# Patient Record
Sex: FEMALE | Race: WHITE | NOT HISPANIC OR LATINO | Employment: OTHER | URBAN - METROPOLITAN AREA
[De-identification: names, ages, dates, MRNs, and addresses within clinical notes are randomized per-mention and may not be internally consistent; named-entity substitution may affect disease eponyms.]

---

## 2023-06-19 ENCOUNTER — OFFICE VISIT (OUTPATIENT)
Dept: NEUROLOGY | Facility: CLINIC | Age: 68
End: 2023-06-19
Payer: MEDICARE

## 2023-06-19 ENCOUNTER — LAB VISIT (OUTPATIENT)
Dept: LAB | Facility: HOSPITAL | Age: 68
End: 2023-06-19
Attending: PSYCHIATRY & NEUROLOGY
Payer: MEDICARE

## 2023-06-19 ENCOUNTER — TELEPHONE (OUTPATIENT)
Dept: NEUROLOGY | Facility: CLINIC | Age: 68
End: 2023-06-19
Payer: MEDICARE

## 2023-06-19 VITALS
HEART RATE: 91 BPM | HEIGHT: 66 IN | SYSTOLIC BLOOD PRESSURE: 122 MMHG | WEIGHT: 211.44 LBS | DIASTOLIC BLOOD PRESSURE: 85 MMHG | BODY MASS INDEX: 33.98 KG/M2

## 2023-06-19 DIAGNOSIS — R41.3 OTHER AMNESIA: Primary | ICD-10-CM

## 2023-06-19 DIAGNOSIS — Z81.8 FAMILY HISTORY OF DEMENTIA: ICD-10-CM

## 2023-06-19 DIAGNOSIS — R41.3 OTHER AMNESIA: ICD-10-CM

## 2023-06-19 DIAGNOSIS — R41.3 MEMORY LOSS: ICD-10-CM

## 2023-06-19 LAB
ALBUMIN SERPL BCP-MCNC: 4 G/DL (ref 3.5–5.2)
ALP SERPL-CCNC: 95 U/L (ref 55–135)
ALT SERPL W/O P-5'-P-CCNC: 17 U/L (ref 10–44)
ANION GAP SERPL CALC-SCNC: 14 MMOL/L (ref 8–16)
AST SERPL-CCNC: 17 U/L (ref 10–40)
BILIRUB SERPL-MCNC: 0.6 MG/DL (ref 0.1–1)
BUN SERPL-MCNC: 14 MG/DL (ref 8–23)
CALCIUM SERPL-MCNC: 9.7 MG/DL (ref 8.7–10.5)
CHLORIDE SERPL-SCNC: 103 MMOL/L (ref 95–110)
CO2 SERPL-SCNC: 24 MMOL/L (ref 23–29)
CREAT SERPL-MCNC: 0.8 MG/DL (ref 0.5–1.4)
EST. GFR  (NO RACE VARIABLE): >60 ML/MIN/1.73 M^2
FOLATE SERPL-MCNC: 6.7 NG/ML (ref 4–24)
GLUCOSE SERPL-MCNC: 212 MG/DL (ref 70–110)
POTASSIUM SERPL-SCNC: 4.5 MMOL/L (ref 3.5–5.1)
PROT SERPL-MCNC: 7.3 G/DL (ref 6–8.4)
SODIUM SERPL-SCNC: 141 MMOL/L (ref 136–145)
T4 FREE SERPL-MCNC: 0.9 NG/DL (ref 0.71–1.51)
TSH SERPL DL<=0.005 MIU/L-ACNC: 4.41 UIU/ML (ref 0.4–4)
VIT B12 SERPL-MCNC: 411 PG/ML (ref 210–950)

## 2023-06-19 PROCEDURE — 99205 PR OFFICE/OUTPT VISIT, NEW, LEVL V, 60-74 MIN: ICD-10-PCS | Mod: S$GLB,,, | Performed by: PSYCHIATRY & NEUROLOGY

## 2023-06-19 PROCEDURE — 1126F PR PAIN SEVERITY QUANTIFIED, NO PAIN PRESENT: ICD-10-PCS | Mod: CPTII,S$GLB,, | Performed by: PSYCHIATRY & NEUROLOGY

## 2023-06-19 PROCEDURE — 85025 COMPLETE CBC W/AUTO DIFF WBC: CPT | Performed by: PSYCHIATRY & NEUROLOGY

## 2023-06-19 PROCEDURE — 82746 ASSAY OF FOLIC ACID SERUM: CPT | Performed by: PSYCHIATRY & NEUROLOGY

## 2023-06-19 PROCEDURE — 3008F PR BODY MASS INDEX (BMI) DOCUMENTED: ICD-10-PCS | Mod: CPTII,S$GLB,, | Performed by: PSYCHIATRY & NEUROLOGY

## 2023-06-19 PROCEDURE — 3008F BODY MASS INDEX DOCD: CPT | Mod: CPTII,S$GLB,, | Performed by: PSYCHIATRY & NEUROLOGY

## 2023-06-19 PROCEDURE — 1160F PR REVIEW ALL MEDS BY PRESCRIBER/CLIN PHARMACIST DOCUMENTED: ICD-10-PCS | Mod: CPTII,S$GLB,, | Performed by: PSYCHIATRY & NEUROLOGY

## 2023-06-19 PROCEDURE — 1159F MED LIST DOCD IN RCRD: CPT | Mod: CPTII,S$GLB,, | Performed by: PSYCHIATRY & NEUROLOGY

## 2023-06-19 PROCEDURE — 82607 VITAMIN B-12: CPT | Performed by: PSYCHIATRY & NEUROLOGY

## 2023-06-19 PROCEDURE — 3079F DIAST BP 80-89 MM HG: CPT | Mod: CPTII,S$GLB,, | Performed by: PSYCHIATRY & NEUROLOGY

## 2023-06-19 PROCEDURE — 3074F PR MOST RECENT SYSTOLIC BLOOD PRESSURE < 130 MM HG: ICD-10-PCS | Mod: CPTII,S$GLB,, | Performed by: PSYCHIATRY & NEUROLOGY

## 2023-06-19 PROCEDURE — 4010F ACE/ARB THERAPY RXD/TAKEN: CPT | Mod: CPTII,S$GLB,, | Performed by: PSYCHIATRY & NEUROLOGY

## 2023-06-19 PROCEDURE — 4010F PR ACE/ARB THEARPY RXD/TAKEN: ICD-10-PCS | Mod: CPTII,S$GLB,, | Performed by: PSYCHIATRY & NEUROLOGY

## 2023-06-19 PROCEDURE — 1159F PR MEDICATION LIST DOCUMENTED IN MEDICAL RECORD: ICD-10-PCS | Mod: CPTII,S$GLB,, | Performed by: PSYCHIATRY & NEUROLOGY

## 2023-06-19 PROCEDURE — 1160F RVW MEDS BY RX/DR IN RCRD: CPT | Mod: CPTII,S$GLB,, | Performed by: PSYCHIATRY & NEUROLOGY

## 2023-06-19 PROCEDURE — 99999 PR PBB SHADOW E&M-EST. PATIENT-LVL IV: ICD-10-PCS | Mod: PBBFAC,,, | Performed by: PSYCHIATRY & NEUROLOGY

## 2023-06-19 PROCEDURE — 1100F PR PT FALLS ASSESS DOC 2+ FALLS/FALL W/INJURY/YR: ICD-10-PCS | Mod: CPTII,S$GLB,, | Performed by: PSYCHIATRY & NEUROLOGY

## 2023-06-19 PROCEDURE — 80053 COMPREHEN METABOLIC PANEL: CPT | Performed by: PSYCHIATRY & NEUROLOGY

## 2023-06-19 PROCEDURE — 86592 SYPHILIS TEST NON-TREP QUAL: CPT | Performed by: PSYCHIATRY & NEUROLOGY

## 2023-06-19 PROCEDURE — 99205 OFFICE O/P NEW HI 60 MIN: CPT | Mod: S$GLB,,, | Performed by: PSYCHIATRY & NEUROLOGY

## 2023-06-19 PROCEDURE — 3079F PR MOST RECENT DIASTOLIC BLOOD PRESSURE 80-89 MM HG: ICD-10-PCS | Mod: CPTII,S$GLB,, | Performed by: PSYCHIATRY & NEUROLOGY

## 2023-06-19 PROCEDURE — 84439 ASSAY OF FREE THYROXINE: CPT | Performed by: PSYCHIATRY & NEUROLOGY

## 2023-06-19 PROCEDURE — 3074F SYST BP LT 130 MM HG: CPT | Mod: CPTII,S$GLB,, | Performed by: PSYCHIATRY & NEUROLOGY

## 2023-06-19 PROCEDURE — 84443 ASSAY THYROID STIM HORMONE: CPT | Performed by: PSYCHIATRY & NEUROLOGY

## 2023-06-19 PROCEDURE — 99999 PR PBB SHADOW E&M-EST. PATIENT-LVL IV: CPT | Mod: PBBFAC,,, | Performed by: PSYCHIATRY & NEUROLOGY

## 2023-06-19 PROCEDURE — 3288F FALL RISK ASSESSMENT DOCD: CPT | Mod: CPTII,S$GLB,, | Performed by: PSYCHIATRY & NEUROLOGY

## 2023-06-19 PROCEDURE — 3288F PR FALLS RISK ASSESSMENT DOCUMENTED: ICD-10-PCS | Mod: CPTII,S$GLB,, | Performed by: PSYCHIATRY & NEUROLOGY

## 2023-06-19 PROCEDURE — 36415 COLL VENOUS BLD VENIPUNCTURE: CPT | Mod: PO | Performed by: PSYCHIATRY & NEUROLOGY

## 2023-06-19 PROCEDURE — 1126F AMNT PAIN NOTED NONE PRSNT: CPT | Mod: CPTII,S$GLB,, | Performed by: PSYCHIATRY & NEUROLOGY

## 2023-06-19 PROCEDURE — 1100F PTFALLS ASSESS-DOCD GE2>/YR: CPT | Mod: CPTII,S$GLB,, | Performed by: PSYCHIATRY & NEUROLOGY

## 2023-06-19 PROCEDURE — 84425 ASSAY OF VITAMIN B-1: CPT | Performed by: PSYCHIATRY & NEUROLOGY

## 2023-06-19 RX ORDER — HYDROCODONE BITARTRATE AND ACETAMINOPHEN 5; 325 MG/1; MG/1
1 TABLET ORAL DAILY PRN
COMMUNITY
Start: 2023-06-06

## 2023-06-19 RX ORDER — AMLODIPINE AND VALSARTAN 10; 160 MG/1; MG/1
1 TABLET ORAL EVERY MORNING
COMMUNITY
Start: 2023-04-28

## 2023-06-19 RX ORDER — SIMVASTATIN 10 MG/1
10 TABLET, FILM COATED ORAL
COMMUNITY
Start: 2023-06-15

## 2023-06-19 RX ORDER — BUSPIRONE HYDROCHLORIDE 5 MG/1
5 TABLET ORAL 3 TIMES DAILY
COMMUNITY
Start: 2023-03-29

## 2023-06-19 RX ORDER — AMITRIPTYLINE HYDROCHLORIDE 50 MG/1
50 TABLET, FILM COATED ORAL NIGHTLY
COMMUNITY
Start: 2023-06-12 | End: 2024-02-05

## 2023-06-19 RX ORDER — DULOXETIN HYDROCHLORIDE 60 MG/1
60 CAPSULE, DELAYED RELEASE ORAL NIGHTLY
COMMUNITY
Start: 2023-03-29

## 2023-06-19 RX ORDER — LIDOCAINE AND PRILOCAINE 25; 25 MG/G; MG/G
CREAM TOPICAL
COMMUNITY
Start: 2023-05-10

## 2023-06-19 NOTE — PROGRESS NOTES
Date: 6/19/2023    Patient ID: Maria Antonia Nolasco is a 67 y.o. female.    Referring Provider:  Genesis Lewis, PhD    Chief Complaint: Memory Loss      History of Present Illness:  Ms. Nolasco is a 67 y.o. female who presents referred by Genesis Lewis, PhD today for evaluation of memory loss. The patient was accompanied by her daughter and son in law who also contributed to the following history.     She has had some concerns about forgetfulness. Her family notes she is forgetful.     She is driving and no trouble. Sometimes she has missed paying bills. She has to leave reminders. She puts notes everywhere.     She has anxiety and depression takes amitriptyline, buspar, and cymbalta. This helps her anxiety. Sometimes she still feels anxious.     She has GIANCARLO but doesn't wear the CPAP. She has claustrophobia.     She has arthritis and takes norco sometimes, about once per month.     Her mother had dementia. She was in her late 80s when she developed dementia and she declined rapidly from there.     Allergies:  Review of patient's allergies indicates:  No Known Allergies    Current Medications:  Current Outpatient Medications   Medication Sig Dispense Refill    amitriptyline (ELAVIL) 50 MG tablet Take 50 mg by mouth every evening.      amlodipine-valsartan (EXFORGE)  mg per tablet Take 1 tablet by mouth every morning.      busPIRone (BUSPAR) 5 MG Tab Take 5 mg by mouth 3 (three) times daily.      DULoxetine (CYMBALTA) 60 MG capsule Take 60 mg by mouth every evening.      HYDROcodone-acetaminophen (NORCO) 5-325 mg per tablet Take 1 tablet by mouth daily as needed.      LIDOcaine-prilocaine (EMLA) cream SMARTSIG:Topical Every 12 Hours PRN      simvastatin (ZOCOR) 10 MG tablet Take 10 mg by mouth.       No current facility-administered medications for this visit.       Past Medical History:  Past Medical History:   Diagnosis Date    Anxiety disorder, unspecified     Hypertension     Unspecified osteoarthritis,  "unspecified site        Past Surgical History:  Past Surgical History:   Procedure Laterality Date    HYSTERECTOMY         Family History:  family history includes Cancer in her father; Dementia in her mother; Heart disease in her father; Neuropathy in her mother; Tremor in her father.    Social History:   reports that she has quit smoking. Her smoking use included cigarettes. She has never used smokeless tobacco. She reports current alcohol use. She reports that she does not use drugs.    Physical Exam:  Vitals:    06/19/23 1409   BP: 122/85   Pulse: 91   Weight: 95.9 kg (211 lb 6.7 oz)   Height: 5' 6" (1.676 m)   PainSc: 0-No pain     Body mass index is 34.12 kg/m².    Neurological Exam:  Mental status: Awake, alert. MMSE-2 25/30  Speech/Language: No dysarthria or aphasia on conversation.   Cranial nerves: Pupils equal round and reactive to light, extraocular movements intact, facial strength and sensation intact bilaterally,  tongue midline, hearing grossly intact bilaterally. Shoulder shrug normal bilaterally.   Motor: 5 out of 5 strength throughout the upper and lower extremities bilaterally. Normal bulk and tone.   Sensation: Intact to light touch and vibration bilaterally.  DTR: 2+ at the knees and biceps bilaterally.  Coordination: Finger-nose-finger testing and rapid alternating movements normal bilaterally. No tremor.   Gait: Normal gait    Data:  I have personally reviewed the referring provider's notes, labs, & imaging made available to me today.       Assessment and Plan:  Ms. Nolasco is a 67 y.o. female referred to me by Genesis Lewis, PhD for evaluation of memory concerns. Her MMSE is lower than normal but most of the missed points were from serial 7s and one from not writing a subject in the sentence so the pattern is not concerning today. I discussed that untreated GIANCARLO and her anxiety/depression contribute to cognitive concerns. Amitriptyline has anticholinergic effects and potentially impacts " memory as well. Will obtain labs, MRI brain, and neuropsych testing for evaluation.     Other amnesia  -     Vitamin B12; Future; Expected date: 06/19/2023  -     FOLATE; Future; Expected date: 06/19/2023  -     TSH; Future; Expected date: 06/19/2023  -     CBC Auto Differential; Future; Expected date: 06/19/2023  -     Comprehensive metabolic panel; Future; Expected date: 06/19/2023  -     RPR; Future; Expected date: 06/19/2023  -     VITAMIN B1; Future; Expected date: 06/19/2023  -     MRI Brain Without Contrast; Future; Expected date: 06/19/2023  -     Ambulatory referral/consult to Neuropsychology; Future; Expected date: 06/26/2023    Family history of dementia  -     Vitamin B12; Future; Expected date: 06/19/2023  -     FOLATE; Future; Expected date: 06/19/2023  -     TSH; Future; Expected date: 06/19/2023  -     CBC Auto Differential; Future; Expected date: 06/19/2023  -     Comprehensive metabolic panel; Future; Expected date: 06/19/2023  -     RPR; Future; Expected date: 06/19/2023  -     VITAMIN B1; Future; Expected date: 06/19/2023  -     Ambulatory referral/consult to Neuropsychology; Future; Expected date: 06/26/2023    Memory loss  -     Vitamin B12; Future; Expected date: 06/19/2023  -     FOLATE; Future; Expected date: 06/19/2023  -     TSH; Future; Expected date: 06/19/2023  -     CBC Auto Differential; Future; Expected date: 06/19/2023  -     Comprehensive metabolic panel; Future; Expected date: 06/19/2023  -     RPR; Future; Expected date: 06/19/2023  -     VITAMIN B1; Future; Expected date: 06/19/2023  -     Ambulatory referral/consult to Neuropsychology; Future; Expected date: 06/26/2023    I spent a total of 62 minutes on the day of the visit.This includes face to face time and non-face to face time preparing to see the patient (eg, review of tests), Obtaining and/or reviewing separately obtained history, Documenting clinical information in the electronic or other health record, Independently  interpreting results and communicating results to the patient/family/caregiver, or Care coordination.

## 2023-06-20 LAB
BASOPHILS # BLD AUTO: 0.04 K/UL (ref 0–0.2)
BASOPHILS NFR BLD: 0.7 % (ref 0–1.9)
DIFFERENTIAL METHOD: NORMAL
EOSINOPHIL # BLD AUTO: 0.2 K/UL (ref 0–0.5)
EOSINOPHIL NFR BLD: 3.6 % (ref 0–8)
ERYTHROCYTE [DISTWIDTH] IN BLOOD BY AUTOMATED COUNT: 12.2 % (ref 11.5–14.5)
HCT VFR BLD AUTO: 38.1 % (ref 37–48.5)
HGB BLD-MCNC: 12.5 G/DL (ref 12–16)
IMM GRANULOCYTES # BLD AUTO: 0.01 K/UL (ref 0–0.04)
IMM GRANULOCYTES NFR BLD AUTO: 0.2 % (ref 0–0.5)
LYMPHOCYTES # BLD AUTO: 2.8 K/UL (ref 1–4.8)
LYMPHOCYTES NFR BLD: 45.6 % (ref 18–48)
MCH RBC QN AUTO: 28.9 PG (ref 27–31)
MCHC RBC AUTO-ENTMCNC: 32.8 G/DL (ref 32–36)
MCV RBC AUTO: 88 FL (ref 82–98)
MONOCYTES # BLD AUTO: 0.4 K/UL (ref 0.3–1)
MONOCYTES NFR BLD: 6.9 % (ref 4–15)
NEUTROPHILS # BLD AUTO: 2.6 K/UL (ref 1.8–7.7)
NEUTROPHILS NFR BLD: 43 % (ref 38–73)
NRBC BLD-RTO: 0 /100 WBC
PLATELET # BLD AUTO: 329 K/UL (ref 150–450)
PMV BLD AUTO: 9.5 FL (ref 9.2–12.9)
RBC # BLD AUTO: 4.33 M/UL (ref 4–5.4)
RPR SER QL: NORMAL
WBC # BLD AUTO: 6.12 K/UL (ref 3.9–12.7)

## 2023-06-23 ENCOUNTER — TELEPHONE (OUTPATIENT)
Dept: NEUROLOGY | Facility: CLINIC | Age: 68
End: 2023-06-23
Payer: MEDICARE

## 2023-06-23 LAB — VIT B1 BLD-MCNC: 71 UG/L (ref 38–122)

## 2023-06-23 NOTE — TELEPHONE ENCOUNTER
----- Message from Shannen Steinberg MD sent at 6/23/2023  8:08 AM CDT -----  TSH thyroid screening is abnormal. Other labs are normal. I would recommend she followup with her primary care doctor about her thyroid function test.

## 2023-06-30 NOTE — TELEPHONE ENCOUNTER
Called patient to inform of lab results. No answer, no voicemail. Unable to leave a message. Sending letter with results to patient

## 2023-10-25 ENCOUNTER — TELEPHONE (OUTPATIENT)
Dept: NEUROLOGY | Facility: CLINIC | Age: 68
End: 2023-10-25
Payer: MEDICARE

## 2023-10-25 NOTE — TELEPHONE ENCOUNTER
----- Message from Kell Quiñonez sent at 10/25/2023  2:30 PM CDT -----  Contact: Self  Type:  Sooner Appointment Request    Caller is requesting a sooner appointment.  Caller declined first available appointment listed below.  Caller will not accept being placed on the waitlist and is requesting a message be sent to doctor.    Name of Caller:  Patient  When is the first available appointment?  N/A  Symptoms:  Needs f/u after testing for memory  Would the patient rather a call back or a response via [facility]? Call back  Best Call Back Number:  [phone]  Additional Information:  Can we call pt back to advise, stated she needs something on a Monday afternoon. Thank You

## 2024-02-05 ENCOUNTER — TELEPHONE (OUTPATIENT)
Dept: NEUROLOGY | Facility: CLINIC | Age: 69
End: 2024-02-05

## 2024-02-05 ENCOUNTER — OFFICE VISIT (OUTPATIENT)
Dept: NEUROLOGY | Facility: CLINIC | Age: 69
End: 2024-02-05
Payer: MEDICARE

## 2024-02-05 VITALS
DIASTOLIC BLOOD PRESSURE: 83 MMHG | RESPIRATION RATE: 16 BRPM | HEART RATE: 88 BPM | SYSTOLIC BLOOD PRESSURE: 123 MMHG | WEIGHT: 186.94 LBS | HEIGHT: 66 IN | BODY MASS INDEX: 30.04 KG/M2

## 2024-02-05 DIAGNOSIS — R41.3 MEMORY LOSS: Primary | ICD-10-CM

## 2024-02-05 DIAGNOSIS — F33.0 MILD EPISODE OF RECURRENT MAJOR DEPRESSIVE DISORDER: ICD-10-CM

## 2024-02-05 DIAGNOSIS — F41.9 ANXIETY: ICD-10-CM

## 2024-02-05 PROBLEM — F32.A DEPRESSION: Status: ACTIVE | Noted: 2024-02-05

## 2024-02-05 PROCEDURE — 99214 OFFICE O/P EST MOD 30 MIN: CPT | Mod: S$GLB,,, | Performed by: NURSE PRACTITIONER

## 2024-02-05 PROCEDURE — 1159F MED LIST DOCD IN RCRD: CPT | Mod: CPTII,S$GLB,, | Performed by: NURSE PRACTITIONER

## 2024-02-05 PROCEDURE — 3008F BODY MASS INDEX DOCD: CPT | Mod: CPTII,S$GLB,, | Performed by: NURSE PRACTITIONER

## 2024-02-05 PROCEDURE — 1126F AMNT PAIN NOTED NONE PRSNT: CPT | Mod: CPTII,S$GLB,, | Performed by: NURSE PRACTITIONER

## 2024-02-05 PROCEDURE — 3074F SYST BP LT 130 MM HG: CPT | Mod: CPTII,S$GLB,, | Performed by: NURSE PRACTITIONER

## 2024-02-05 PROCEDURE — 99999 PR PBB SHADOW E&M-EST. PATIENT-LVL III: CPT | Mod: PBBFAC,,, | Performed by: NURSE PRACTITIONER

## 2024-02-05 PROCEDURE — 3288F FALL RISK ASSESSMENT DOCD: CPT | Mod: CPTII,S$GLB,, | Performed by: NURSE PRACTITIONER

## 2024-02-05 PROCEDURE — 1101F PT FALLS ASSESS-DOCD LE1/YR: CPT | Mod: CPTII,S$GLB,, | Performed by: NURSE PRACTITIONER

## 2024-02-05 PROCEDURE — 3079F DIAST BP 80-89 MM HG: CPT | Mod: CPTII,S$GLB,, | Performed by: NURSE PRACTITIONER

## 2024-02-05 NOTE — PROGRESS NOTES
Caregiver name: Yoly  Relationship to the patient: daughter  Does the patient have a living will? No  Does the patient have a designated healthcare POA? No  Does the patient have a designated general POA? No    Have educational materials/resources been provided? Yes      Activities of Daily Living    Bathing: Independent  Dressing: Independent  Grooming: Independent  Mouth Care: Independent  Toileting: Independent  Transferring Bed/Chair: Independent  Walking: Independent  Climbing: Independent  Eating: Independent      Instrumental Activities of Daily Living    Shopping: Independent  Cooking: Independent  Managing Medications: Independent  Using the phone and looking up numbers: Independent  Doing Housework: Independent  Doing Laundry: Independent  Driving or using public transportation: Independent  Managing finances: Independent    Functional Assessment Staging  3   Decreased job functioning evident to co-workers. Difficulty in traveling to new locations. Decreased organizational capacity             2/5/2024    10:27 AM 6/19/2023     1:59 PM   Depression Patient Health Questionnaire   Over the last two weeks how often have you been bothered by little interest or pleasure in doing things Several days Not at all   Over the last two weeks how often have you been bothered by feeling down, depressed or hopeless Several days More than half the days   PHQ-2 Total Score 2 2

## 2024-02-05 NOTE — PROGRESS NOTES
NEUROLOGY  Outpatient Visit     [facility]  [address] [facility] [address], [place] [address]  [phone] (office) / [phone] (fax)    Patient Name:  Maria Antonia Nolasco  :  1955  MR #:  [id number]  Acct #:  [id number]    Date of  Visit: 2024    Other Physicians:  Rafael Cheung MD (Primary Care Physician)      CHIEF COMPLAINT: Memory Loss        HISTORY OF PRESENT ILLNESS:  Maria Antonia Nolasco is a 68 y.o. R-handed female seen in f/u for memory loss    Medical history is significant for anxiety, depression, GIANCARLO, arthritis, HTN, HLD    Here with her daughter and RK    New to me today. Evaluated by Dr. Steinberg in 2023. MMSE was 25/30. Dr. Steinberg suspected contributions from untreated GIANCARLO, medication and psychological factors.     She didn't have the brain MRI. Serologies showed elevated TSH but normal T4. She also has not had NP testing.     She and her family feel that things are about the same. She remains independent with ADLs and iADLs.     Sees psychiatry in [place]. She thinks that she is now off of Elavil, but not sure. She still has daily anxiety and depression.     She has been sleeping better over the last couple of weeks. She typically wakes up frequently during the night. Intolerant of CPAP in the past. Sleep study was over ten years ago.      Physically, no changes. She is planning to have cataract surgery in the coming months.     Prior history:  2023:  Ms. Nolasco is a 67 y.o. female who presents referred by Genesis Lewis, PhD today for evaluation of memory loss. The patient was accompanied by her daughter and son in law who also contributed to the following history.      She has had some concerns about forgetfulness. Her family notes she is forgetful.      She is driving and no trouble. Sometimes she has missed paying bills. She has to leave reminders. She puts notes everywhere.      She has anxiety and depression takes amitriptyline, buspar, and cymbalta. This helps her anxiety.  "Sometimes she still feels anxious.      She has GIANCARLO but doesn't wear the CPAP. She has claustrophobia.      She has arthritis and takes norco sometimes, about once per month.      Her mother had dementia. She was in her late 80s when she developed dementia and she declined rapidly from there.     Allergies:  Review of patient's allergies indicates:   Allergen Reactions    House dust        Current Medications:  Current Outpatient Medications   Medication Sig Dispense Refill    amlodipine-valsartan (EXFORGE)  mg per tablet Take 1 tablet by mouth every morning.      busPIRone (BUSPAR) 5 MG Tab Take 5 mg by mouth 3 (three) times daily.      DULoxetine (CYMBALTA) 60 MG capsule Take 60 mg by mouth every evening.      HYDROcodone-acetaminophen (NORCO) 5-325 mg per tablet Take 1 tablet by mouth daily as needed.      LIDOcaine-prilocaine (EMLA) cream SMARTSIG:Topical Every 12 Hours PRN      simvastatin (ZOCOR) 10 MG tablet Take 10 mg by mouth.       No current facility-administered medications for this visit.       Past Medical History:  Past Medical History:   Diagnosis Date    Anxiety disorder, unspecified     Hypertension     Unspecified osteoarthritis, unspecified site        Past Surgical History:  Past Surgical History:   Procedure Laterality Date    HYSTERECTOMY         Family History:  family history includes Cancer in her father; Dementia in her mother; Heart disease in her father; Neuropathy in her mother; Tremor in her father.    Social History:   reports that she has quit smoking. Her smoking use included cigarettes. She has never used smokeless tobacco. She reports current alcohol use. She reports that she does not use drugs.      REVIEW OF SYSTEMS:  As per HPI    PHYSICAL EXAM:  /83 (BP Location: Right arm, Patient Position: Sitting, BP Method: Medium (Automatic))   Pulse 88   Resp 16   Ht 5' 6" (1.676 m)   Wt 84.8 kg (186 lb 15.2 oz)   BMI 30.17 kg/m²     General: Well groomed. No acute " distress.  Pulmonary: Normal effort and rate.   Musculoskeletal: No obvious joint deformities, moves all extremities well.  Extremities: No clubbing, cyanosis or edema.     Neurological exam:  Mental status: Awake and alert.  Oriented to person, place, time and situation. Recent and remote memory appear to be intact.  Fund of knowledge normal. Normal attention and concentration. MMSE 29/30.   Speech/Language: Fluent and appropriate. No dysarthria or aphasia on conversation. Able to follow complex commands.   Cranial nerves (II-XII): Visual fields full. Pupils equal round and reactive to light, extraocular movements intact, no ptosis, no nystagmus. Facial sensation and symmetry intact bilaterally. Hearing grossly intact. Palate mobile and midline. Shoulder shrug normal bilaterally. Normal tongue protrusion.   Motor: 5 out of 5 strength throughout the upper and lower extremities bilaterally. Normal bulk and tone. No abnormal movements noted. No drift appreciated.   Sensation: Intact to light touch  DTR:deferred  Coordination: Finger-nose-finger testing intact bilaterally. RAIMUNDO normal bilaterally. Postural tremor of BLE, distractible.   Gait: normal stride and station. Decreased severiano arm swing.       DIAGNOSTIC DATA:  I have personally reviewed provider notes, labs and imaging made available to me today.     Imaging:  Na    Cardiac:  Na    Labs:  Lab Results   Component Value Date    WBC 6.12 06/19/2023    HGB 12.5 06/19/2023    HCT 38.1 06/19/2023     06/19/2023    MCV 88 06/19/2023    RDW 12.2 06/19/2023     Lab Results   Component Value Date     06/19/2023    K 4.5 06/19/2023     06/19/2023    CO2 24 06/19/2023    BUN 14 06/19/2023    CREATININE 0.8 06/19/2023     (H) 06/19/2023    CALCIUM 9.7 06/19/2023     Lab Results   Component Value Date    PROT 7.3 06/19/2023    ALBUMIN 4.0 06/19/2023    BILITOT 0.6 06/19/2023    AST 17 06/19/2023    ALKPHOS 95 06/19/2023    ALT 17 06/19/2023     No  "results found for: "INR", "PROTIME", "PTT"  No results found for: "CHOL", "HDL", "LDLCALC", "TRIG", "CHOLHDL"  No results found for: "LABA1C", "HGBA1C"   Lab Results   Component Value Date    UCDLPYLT64 411 06/19/2023     Lab Results   Component Value Date    FOLATE 6.7 06/19/2023     Lab Results   Component Value Date    TSH 4.411 (H) 06/19/2023     Component      Latest Ref Rng 6/19/2023   RPR      Non-reactive  Non-reactive    Thiamine      38 - 122 ug/L 71    Free T4      0.71 - 1.51 ng/dL 0.90        ASSESSMENT & PLAN:  Maria Antonia Nolasco is a 68 y.o. R-handed female seen in f/u for memory loss    Problem List Items Addressed This Visit          Neuro    Memory loss - Primary    Overview     MMSE  25/30 June 2023 29/30 Feb 2024         Current Assessment & Plan     No progression over time, remains independent   Suspect pseudo pattern, related to psychological factors and untreated GIANCARLO  In the past, on TCA. She thinks that this has since been dc but is not sure  No red flags  Has not had previously ordered MRI brain or NP testing, so we will need to f/u on these & have her return to review the results             Psychiatric    Anxiety    Depression       Follow up: 6 months / 30 min visit for result review, ok for VV     I spent a total of 30 minutes on the day of the visit.    This includes face to face time with the patient, as well as non-face to face time preparing for and completing the visit (review of prior diagnostic testing and clinical notes, obtaining or reviewing history, documenting clinical information in the EMR, independently interpreting and communicating results to the patient/family and coordinating ongoing care).       I appreciate the opportunity to participate in the care of this patient. Please feel free to contact me with any concerns or questions.       Dai Lechuga, [facility]  [facility]  [address] [facility] [address] [place] [phone]    "

## 2024-02-05 NOTE — PATIENT INSTRUCTIONS
We still need the previously ordered brain MRI and more in depth cognitive testing called neuropscyh testing.     Untreated sleep apnea is likely contributing to your cognitive issues. As we discussed, you may be a candidate for an alternative treatment called Inspire. If you are interested in this, we would need to repeat a  home sleep study to assess the severity of the sleep apnea.    Untreated anxiety or depression can also contribute to cognitive issues.     While memory loss may not be reversible in many cases, we do know that there are several steps that you can take to prevent further memory loss:  Diet:  A Mediterranean style diet has been shown to be beneficial. This diet typically includes fresh fruits/vegetables, whole grains, fish and poultry. Foods, such as red meat, dairy and processed foods are avoided.   Research indicates certain nutrients may aid in brain function, such as B vitamins (especially B6, B12, and folic acid), antioxidants (such as vitamins C and E, and beta carotene), and Omega-3 fatty acids.  Minimize or eliminate the use of alcohol     Medications:  Discuss any prescription or over the counter medications you might be taking with your doctor to avoid those that can cause sedation or worsen cognitive function, such as sleep aids, benzodiazepines, and antihistamines.     Socialization and Exercise:  30-45 minutes of brisk physical activity 5 days/week has been shown to improve function in vascular dementias, lower the risk of stroke and slow the progression of memory loss  Activities that are engaging or mentally stimulating, such as word puzzles, jigsaw puzzles and Sudoku, are also beneficial to cognitive health  Regular interaction with friends, family and community are also known to be helpful.     Sleep:  Establish a regular, consistent sleep pattern and practice good sleep hygiene.    Avoid screen time (computer, TV, smartphones or tablets) or heavy meals for at least an hour  before bedtime.   Avoid caffeine or stimulants after 2 PM.   Exercise earlier in the day or mornings and keep your sleeping environment comfortable. Bedtime and wake-up times should be consistent every night and morning so the body becomes used to a single routine, even on the weekends.   Having a wind down routine (e.g., soft lights in the house, bath before bed, reduced fluid intake, songs, reading, less noise) also helps to promote sleep readiness.   Untreated obstructive sleep apnea can lead to an increased risk for stroke and further memory loss      STRATEGIES TO COPE WITH YOUR COGNITIVE DEFICITS:  Pay Attention!  Reduce distractions in the area  Look at the person speaking to you & paraphrase what they are saying  Write down important things  Ask them to repeat themselves if you zone out  Ask people to simplify or reduce information if you need to  Processing Speed  Using multiple methods to learn new information, such as listening, taking notes, and recording, can be helpful  Give yourself enough time to complete certain tasks to reduce frustration  Executive Functioning  Don't try to multi-task. Do tasks separately to ensure that each gets completed.   Use a calendar or planner to keep you on track  Write down steps to complicated tasks in case you forget  Storing Information  Immediately after you learn something, try to recall it. Repeat this and gradually lengthen the interval between your recalls  Recalling information   Jog your memory! If you loose something, try to think back to when you last had it. Mentally walk yourself through the steps of your day to prod your memory.   Use clues, timers, memos, notes, etc.  Stay organized - keep your keys in a certain place, put your important papers in a certain place, etc.   Having a routine helps to anchor memories as well

## 2024-02-08 ENCOUNTER — TELEPHONE (OUTPATIENT)
Dept: NEUROLOGY | Facility: CLINIC | Age: 69
End: 2024-02-08

## 2024-03-06 ENCOUNTER — TELEPHONE (OUTPATIENT)
Dept: NEUROLOGY | Facility: CLINIC | Age: 69
End: 2024-03-06
Payer: MEDICARE

## 2024-03-06 DIAGNOSIS — R41.3 OTHER AMNESIA: ICD-10-CM

## 2024-03-06 DIAGNOSIS — R41.3 MEMORY LOSS: Primary | ICD-10-CM

## 2024-03-06 NOTE — TELEPHONE ENCOUNTER
----- Message from Kell Olamide sent at 3/6/2024  3:04 PM CST -----  Contact: Self  Patient is calling in regards to the MRI order that was giving to her to take to [facility] in [place]. She is calling to see if we can submit to [facility] for the PA for her to get it scheduled. Stated we can submit this information to them via fax at # [phone]. Can we please call pt back to confirm at [phone], stated to try more than once since she can't always hear her phone. Thank You

## 2024-04-04 ENCOUNTER — TELEPHONE (OUTPATIENT)
Dept: NEUROLOGY | Facility: CLINIC | Age: 69
End: 2024-04-04
Payer: MEDICARE

## 2024-07-19 ENCOUNTER — TELEPHONE (OUTPATIENT)
Dept: NEUROLOGY | Facility: CLINIC | Age: 69
End: 2024-07-19
Payer: MEDICARE

## 2024-08-13 ENCOUNTER — TELEPHONE (OUTPATIENT)
Dept: NEUROLOGY | Facility: CLINIC | Age: 69
End: 2024-08-13
Payer: MEDICARE

## 2024-08-13 NOTE — TELEPHONE ENCOUNTER
----- Message from Kell Quiñonez sent at 8/13/2024 11:14 AM CDT -----  Contact: Yoly  Type:  Sooner Appointment Request    Caller is requesting a sooner appointment.  Caller declined first available appointment listed below.  Caller will not accept being placed on the waitlist and is requesting a message be sent to doctor.    Name of Caller:  Patients daughter Yoly  When is the first available appointment?  N/A  Symptoms:  Memory f/u  Would the patient rather a call back or a response via [facility]? Call  Best Call Back Number:  [phone]  Additional Information:  Pt missed her appt this morning due to transportation issues and needs to have this rescheduled. Can we please call back to assist. Thank You

## 2024-08-13 NOTE — TELEPHONE ENCOUNTER
Spoke to the pt's daughter, appt rescheduled to 8/20/24 at 1030.  Pt had transportation issues this morning.

## 2024-08-19 ENCOUNTER — TELEPHONE (OUTPATIENT)
Dept: NEUROLOGY | Facility: CLINIC | Age: 69
End: 2024-08-19
Payer: MEDICARE

## 2024-08-19 NOTE — TELEPHONE ENCOUNTER
----- Message from Claudio Bateman sent at 8/19/2024  1:38 PM CDT -----  Regarding: Reschedule Appt  Contact: daughter  Type:  Reschedule Appointment Request    Caller is requesting to reschedule appointment.      Name of Caller:Marina Frederick    Date of previous appointment?8/20    Symptoms:memory    Would the patient rather a call back or a response via [facility]? Call back    Best Call Back Number:[phone]      Additional Information: Sts appt needs to be rescheduled no transportation.   Please advise -- Thank you

## 2024-09-18 ENCOUNTER — OFFICE VISIT (OUTPATIENT)
Dept: NEUROLOGY | Facility: CLINIC | Age: 69
End: 2024-09-18
Payer: MEDICARE

## 2024-09-18 ENCOUNTER — TELEPHONE (OUTPATIENT)
Dept: NEUROLOGY | Facility: CLINIC | Age: 69
End: 2024-09-18

## 2024-09-18 VITALS
WEIGHT: 206.88 LBS | HEIGHT: 66 IN | RESPIRATION RATE: 14 BRPM | BODY MASS INDEX: 33.25 KG/M2 | DIASTOLIC BLOOD PRESSURE: 83 MMHG | SYSTOLIC BLOOD PRESSURE: 114 MMHG | HEART RATE: 79 BPM

## 2024-09-18 DIAGNOSIS — R41.3 MEMORY LOSS: ICD-10-CM

## 2024-09-18 DIAGNOSIS — R41.3 OTHER AMNESIA: Primary | ICD-10-CM

## 2024-09-18 PROCEDURE — 1126F AMNT PAIN NOTED NONE PRSNT: CPT | Mod: CPTII,S$GLB,, | Performed by: NURSE PRACTITIONER

## 2024-09-18 PROCEDURE — 3288F FALL RISK ASSESSMENT DOCD: CPT | Mod: CPTII,S$GLB,, | Performed by: NURSE PRACTITIONER

## 2024-09-18 PROCEDURE — 1100F PTFALLS ASSESS-DOCD GE2>/YR: CPT | Mod: CPTII,S$GLB,, | Performed by: NURSE PRACTITIONER

## 2024-09-18 PROCEDURE — 3008F BODY MASS INDEX DOCD: CPT | Mod: CPTII,S$GLB,, | Performed by: NURSE PRACTITIONER

## 2024-09-18 PROCEDURE — 99999 PR PBB SHADOW E&M-EST. PATIENT-LVL III: CPT | Mod: PBBFAC,,, | Performed by: NURSE PRACTITIONER

## 2024-09-18 PROCEDURE — 99214 OFFICE O/P EST MOD 30 MIN: CPT | Mod: S$GLB,,, | Performed by: NURSE PRACTITIONER

## 2024-09-18 PROCEDURE — 3079F DIAST BP 80-89 MM HG: CPT | Mod: CPTII,S$GLB,, | Performed by: NURSE PRACTITIONER

## 2024-09-18 PROCEDURE — 1159F MED LIST DOCD IN RCRD: CPT | Mod: CPTII,S$GLB,, | Performed by: NURSE PRACTITIONER

## 2024-09-18 PROCEDURE — 3074F SYST BP LT 130 MM HG: CPT | Mod: CPTII,S$GLB,, | Performed by: NURSE PRACTITIONER

## 2024-09-18 PROCEDURE — 4010F ACE/ARB THERAPY RXD/TAKEN: CPT | Mod: CPTII,S$GLB,, | Performed by: NURSE PRACTITIONER

## 2024-09-18 RX ORDER — AMITRIPTYLINE HYDROCHLORIDE 25 MG/1
25 TABLET, FILM COATED ORAL NIGHTLY
COMMUNITY
Start: 2024-08-13

## 2024-09-18 RX ORDER — CARIPRAZINE 1.5 MG/1
1.5 CAPSULE, GELATIN COATED ORAL DAILY
COMMUNITY
Start: 2024-07-17

## 2024-09-18 NOTE — PROGRESS NOTES
NEUROLOGY  Outpatient Visit     [facility]  [address] [facility], [place] [address]  [phone] (office) / [phone] (fax)    Patient Name:  Maria Antonia Nolasco  :  1955  MR #:  [id number]  Acct #:  [id number]    Date of  Visit: 2024    Other Physicians:  Rafael Cheung MD (Primary Care Physician)      CHIEF COMPLAINT: Memory Loss (Follow up / no ADLs 30 min okay per HL)        Interval history:  24:  Maria Antonia Nolasco is a 69 y.o. R-handed female seen in / for memory loss    Medical history is significant for anxiety, depression, GIANCARLO, arthritis, HTN, HLD    Here with her daughter    Last visit 2024. MMSE 29/30.     She still has not had the MRI brain. She states she tried to have it done in [place], but never heard back from them about it. She also has not had the cognitive testing done. She was contacted to schedule per notes. They don't recall any of this.     She and her daughter feel that things are stable. She is driving without trouble. Sometimes forgets her medications. No recent missed bill payments.     She continues care with psych. She is still taking Elavil. She is now also taking Vrylar. This has helped with her anxiety and depression.     Sleep is the same. Recall GIANCARLO intolerant to CPAP. Has heard of [facility], not sure if she would have procedure.       HISTORY OF PRESENT ILLNESS:  Maria Antonia Nolasco is a 69 y.o. R-handed female seen in f/u for memory loss    Medical history is significant for anxiety, depression, GIANCARLO, arthritis, HTN, HLD    Here with her daughter and RK    New to me today. Evaluated by Dr. Steinberg in 2023. MMSE was 25/30. Dr. Steinberg suspected contributions from untreated GIANCARLO, medication and psychological factors.     She didn't have the brain MRI. Serologies showed elevated TSH but normal T4. She also has not had NP testing.     She and her family feel that things are about the same. She remains independent with ADLs and iADLs.     Sees psychiatry in  [place]. She thinks that she is now off of Elavil, but not sure. She still has daily anxiety and depression.     She has been sleeping better over the last couple of weeks. She typically wakes up frequently during the night. Intolerant of CPAP in the past. Sleep study was over ten years ago.      Physically, no changes. She is planning to have cataract surgery in the coming months.     Prior history:  6/2023:  Ms. Nolasco is a 67 y.o. female who presents referred by Genesis Lewis, PhD today for evaluation of memory loss. The patient was accompanied by her daughter and son in law who also contributed to the following history.      She has had some concerns about forgetfulness. Her family notes she is forgetful.      She is driving and no trouble. Sometimes she has missed paying bills. She has to leave reminders. She puts notes everywhere.      She has anxiety and depression takes amitriptyline, buspar, and cymbalta. This helps her anxiety. Sometimes she still feels anxious.      She has GIANCARLO but doesn't wear the CPAP. She has claustrophobia.      She has arthritis and takes norco sometimes, about once per month.      Her mother had dementia. She was in her late 80s when she developed dementia and she declined rapidly from there.     Allergies:  Review of patient's allergies indicates:   Allergen Reactions    House dust        Current Medications:  Current Outpatient Medications   Medication Sig Dispense Refill    amitriptyline (ELAVIL) 25 MG tablet Take 25 mg by mouth every evening.      amlodipine-valsartan (EXFORGE)  mg per tablet Take 1 tablet by mouth every morning.      busPIRone (BUSPAR) 5 MG Tab Take 5 mg by mouth 3 (three) times daily.      DULoxetine (CYMBALTA) 60 MG capsule Take 60 mg by mouth every evening.      HYDROcodone-acetaminophen (NORCO) 5-325 mg per tablet Take 1 tablet by mouth daily as needed.      LIDOcaine-prilocaine (EMLA) cream SMARTSIG:Topical Every 12 Hours PRN      simvastatin  "(ZOCOR) 10 MG tablet Take 10 mg by mouth.      VRAYLAR 1.5 mg Cap Take 1.5 mg by mouth once daily.       No current facility-administered medications for this visit.       Past Medical History:  Past Medical History:   Diagnosis Date    Anxiety disorder, unspecified     Hypertension     Unspecified osteoarthritis, unspecified site        Past Surgical History:  Past Surgical History:   Procedure Laterality Date    HYSTERECTOMY         Family History:  family history includes Cancer in her father; Dementia in her mother; Heart disease in her father; Neuropathy in her mother; Tremor in her father.    Social History:   reports that she has quit smoking. Her smoking use included cigarettes. She has never used smokeless tobacco. She reports current alcohol use. She reports that she does not use drugs.      REVIEW OF SYSTEMS:  As per HPI    PHYSICAL EXAM:  /83 (BP Location: Right arm, Patient Position: Sitting, BP Method: Medium (Automatic))   Pulse 79   Resp 14   Ht 5' 6" (1.676 m)   Wt 93.9 kg (206 lb 14.4 oz)   BMI 33.39 kg/m²     General: Well groomed. No acute distress.  Pulmonary: Normal effort and rate.   Musculoskeletal: No obvious joint deformities, moves all extremities well.  Extremities: No clubbing, cyanosis or edema.     Neurological exam:  Mental status: Awake and alert.  Oriented to person, place, time and situation. Recalls 2/3 words on DR. Fund of knowledge normal. Normal attention and concentration. MMSE-2 BV 14/16.   Speech/Language: Fluent and appropriate. No dysarthria or aphasia on conversation. Able to follow complex commands.   Cranial nerves (II-XII): Visual fields full. Pupils equal round and reactive to light, extraocular movements intact, no ptosis, no nystagmus. Facial sensation and symmetry intact bilaterally. Hearing grossly intact. Palate mobile and midline. Shoulder shrug normal bilaterally. Normal tongue protrusion.   Motor: 5 out of 5 strength throughout the upper and lower " "extremities bilaterally. Normal bulk and tone. No abnormal movements noted. No drift appreciated.   Sensation: Intact to light touch  DTR:deferred  Coordination: Finger-nose-finger testing intact bilaterally. RAIMUNDO normal bilaterally. RLE tremor with posture, dis tractable.   Gait: normal stride and station. Decreased severiano arm swing.       DIAGNOSTIC DATA:  I have personally reviewed provider notes, labs and imaging made available to me today.     Imaging:  Na    Cardiac:  Na    Labs:  Lab Results   Component Value Date    WBC 6.12 06/19/2023    HGB 12.5 06/19/2023    HCT 38.1 06/19/2023     06/19/2023    MCV 88 06/19/2023    RDW 12.2 06/19/2023     Lab Results   Component Value Date     06/19/2023    K 4.5 06/19/2023     06/19/2023    CO2 24 06/19/2023    BUN 14 06/19/2023    CREATININE 0.8 06/19/2023     (H) 06/19/2023    CALCIUM 9.7 06/19/2023     Lab Results   Component Value Date    PROT 7.3 06/19/2023    ALBUMIN 4.0 06/19/2023    BILITOT 0.6 06/19/2023    AST 17 06/19/2023    ALKPHOS 95 06/19/2023    ALT 17 06/19/2023     No results found for: "INR", "PROTIME", "PTT"  No results found for: "CHOL", "HDL", "LDLCALC", "TRIG", "CHOLHDL"  No results found for: "LABA1C", "HGBA1C"   Lab Results   Component Value Date    [id number] 411 06/19/2023     Lab Results   Component Value Date    FOLATE 6.7 06/19/2023     Lab Results   Component Value Date    TSH 4.411 (H) 06/19/2023     Component      Latest Ref Rng 6/19/2023   RPR      Non-reactive  Non-reactive    Thiamine      38 - 122 ug/L 71    Free T4      0.71 - 1.51 ng/dL 0.90        ASSESSMENT & PLAN:  Maria Antonia Nolasco is a 69 y.o. R-handed female seen in f/u for memory loss    Problem List Items Addressed This Visit          Neuro    Memory loss    Overview     MMSE  25/30 June 2023 29/30 Feb 2024    BV:  14/16 Sept 2024         Current Assessment & Plan     Remains subjectively stable over time  We have previously discussed the cognitive effects " of TCA use -- she continues on Elavil per psychiatry  She still has not completed previously ordered diagnostic testing -- MRI brain, NP testing   Will resend MRI order to [facility] today. Will give her / daughter the number to call about scheduling NP testing   Follow up after testing             Other Visit Diagnoses       Other amnesia    -  Primary              Follow up: AFTER NP TESTING / MRI     I spent a total of 30 minutes on the day of the visit.    This includes face to face time with the patient, as well as non-face to face time preparing for and completing the visit (review of prior diagnostic testing and clinical notes, obtaining or reviewing history, documenting clinical information in the EMR, independently interpreting and communicating results to the patient/family and coordinating ongoing care).       I appreciate the opportunity to participate in the care of this patient. Please feel free to contact me with any concerns or questions.       Dai Lechuga, [facility]  [facility]  [address] [place] [address]       Neuro

## 2024-09-18 NOTE — PATIENT INSTRUCTIONS
Will resend MRI brain orders to [facility]    They have tried to contact both you and your family to schedule the cognitive testing (neuropsychological testing). This is an important part of our evaluation to determine the likely cause of your cognitive issues.     Follow up with me after you have had the testing done.     Please call our clinic at [phone] or send a message on the [facility] portal if there are any changes to the plan discussed today. For example, if you are not contacted for the requested tests, referral(s) within one week, if you are unable to receive the medications prescribed, or if you feel you need to change the treatment course for any reason.

## 2024-09-18 NOTE — ASSESSMENT & PLAN NOTE
Remains subjectively stable over time  We have previously discussed the cognitive effects of TCA use -- she continues on Elavil per psychiatry  She still has not completed previously ordered diagnostic testing -- MRI brain, NP testing   Will resend MRI order to [facility] today. Will give her / daughter the number to call about scheduling NP testing   Follow up after testing

## 2024-09-24 ENCOUNTER — TELEPHONE (OUTPATIENT)
Dept: NEUROLOGY | Facility: CLINIC | Age: 69
End: 2024-09-24
Payer: MEDICARE

## 2024-09-24 NOTE — TELEPHONE ENCOUNTER
"----- Message from Azalea Elias sent at 9/24/2024  2:02 PM CDT -----  Regarding: Order  "Type:  Patient Call Back    Who Called:PT    What is the reqeust in detail:Requesting call back in regards to an order that she was informed  to do for memory testing. Please advise    Can the clinic reply by [facility]?no     Best Call Back Number:[phone]      Additional Information:  "